# Patient Record
Sex: FEMALE | Race: WHITE
[De-identification: names, ages, dates, MRNs, and addresses within clinical notes are randomized per-mention and may not be internally consistent; named-entity substitution may affect disease eponyms.]

---

## 2017-10-18 ENCOUNTER — HOSPITAL ENCOUNTER (OUTPATIENT)
Dept: HOSPITAL 35 - MRI | Age: 67
End: 2017-10-18
Attending: ORTHOPAEDIC SURGERY
Payer: COMMERCIAL

## 2017-10-18 DIAGNOSIS — M19.012: ICD-10-CM

## 2017-10-18 DIAGNOSIS — M75.102: Primary | ICD-10-CM

## 2020-02-20 ENCOUNTER — HOSPITAL ENCOUNTER (OUTPATIENT)
Dept: HOSPITAL 35 - TBA | Age: 70
Discharge: HOME | End: 2020-02-20
Attending: OPHTHALMOLOGY
Payer: COMMERCIAL

## 2020-02-20 VITALS — SYSTOLIC BLOOD PRESSURE: 146 MMHG | DIASTOLIC BLOOD PRESSURE: 58 MMHG

## 2020-02-20 VITALS — HEIGHT: 64.02 IN | BODY MASS INDEX: 30.73 KG/M2 | WEIGHT: 180.01 LBS

## 2020-02-20 DIAGNOSIS — Z98.890: ICD-10-CM

## 2020-02-20 DIAGNOSIS — Z79.899: ICD-10-CM

## 2020-02-20 DIAGNOSIS — K21.9: ICD-10-CM

## 2020-02-20 DIAGNOSIS — I10: ICD-10-CM

## 2020-02-20 DIAGNOSIS — H04.551: Primary | ICD-10-CM

## 2020-02-20 LAB
ANION GAP SERPL CALC-SCNC: 7 MMOL/L (ref 7–16)
BUN SERPL-MCNC: 28 MG/DL (ref 7–18)
CALCIUM SERPL-MCNC: 9.3 MG/DL (ref 8.5–10.1)
CHLORIDE SERPL-SCNC: 103 MMOL/L (ref 98–107)
CO2 SERPL-SCNC: 26 MMOL/L (ref 21–32)
CREAT SERPL-MCNC: 0.9 MG/DL (ref 0.6–1)
GLUCOSE SERPL-MCNC: 100 MG/DL (ref 74–106)
POTASSIUM SERPL-SCNC: 4.2 MMOL/L (ref 3.5–5.1)
SODIUM SERPL-SCNC: 136 MMOL/L (ref 136–145)

## 2020-02-20 PROCEDURE — 56528: CPT

## 2020-02-20 PROCEDURE — 50101: CPT

## 2020-02-20 PROCEDURE — 51777: CPT

## 2020-02-20 PROCEDURE — 50398: CPT

## 2020-02-20 PROCEDURE — 64037: CPT

## 2020-02-20 PROCEDURE — 50386 REMOVE STENT VIA TRANSURETH: CPT

## 2020-02-20 PROCEDURE — 55343: CPT

## 2020-02-20 PROCEDURE — 70005: CPT

## 2020-02-20 PROCEDURE — 50010 RENAL EXPLORATION: CPT

## 2020-02-20 NOTE — O
[facility]
[place]   [address]                     OPERATIVE REPORT              
_______________________________________________________________________________
 
Name:       DI PARNELL              Room #:         150-5       [facility] 
M..#:      [id number]                       Account #:      [id number]  
Admission:  02/20/20    Attend Phys:    Reynold Cisneros MD  
Discharge:              Date of Birth:  [birth year]  
                                                          Report #: [id number]
                                                                    [id number]   
_______________________________________________________________________________
THIS REPORT FOR:  
 
cc:  Moi St MD,Echo Cisneros,Reynold DAVID MD                                          ~
CC: Echo Cisneros
 
DATE OF SERVICE:  02/20/2020
 
 
PREOPERATIVE DIAGNOSIS:  Recurrent right-sided nasolacrimal duct obstruction
with dacryocystitis.
 
POSTOPERATIVE DIAGNOSIS:  Recurrent right-sided nasolacrimal duct obstruction
with dacryocystitis.
 
PROCEDURE:  Right endoscopic dacryocystorhinostomy with silicone intubation.
 
SURGEON:  Reynold Cisneros MD.
 
ASSISTANT:  None.
 
ANESTHESIA:  General.
 
COMPLICATIONS:  None.
 
INDICATIONS FOR SURGERY:  This pleasant 69-year-old woman has a recurrent
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  She has
previously undergone surgical intervention more than once, so already
experienced a recurrent occlusion.  She presents today for an endoscopic balloon
dacryocystorhinostomy with silicone intubation.  Informed consent was obtained
to include, but not limited to the potential risk for loss of vision, bleeding,
infection, the potential need for eventual open surgery.
 
DESCRIPTION OF PROCEDURE:  The patient was taken to the operating room where
general anesthesia was administered.  The right medial canthal area and the
right lateral wall of the nose were then generously infiltrated with Xylocaine
with epinephrine mixed with Marcaine and Wydase.  The patient was subsequently
prepped and draped in the usual sterile fashion.  The right side of the nose had
been packed preoperatively with Afrin-soaked cottonoids.  The superior and
inferior puncta were then dilated with a double-ended punctum dilator.  A size 3
stainless steel Perkins probe was then passed through the superior canaliculus
down the previously created tract into the nasal vault.  The cottonoids were
removed from the nose and the nasal vault inspected.  There was a cicatrix of
 
 
 
[address]   [address]                     OPERATIVE REPORT              
_______________________________________________________________________________
 
Name:       PARMJITDI TANESHA              Room #:         150-5       [facility]..#:      [id number]                       Account #:      [id number]  
Admission:  02/20/20    Attend Phys:    Reynold Cisneros MD  
Discharge:              Date of Birth:  [birth year]  
                                                          Report #: [id number]
                                                                    [id number]   
_______________________________________________________________________________
mucus membrane tissue at the anterior root of the middle turbinate including the
ostium.  This was moved posteriorly to allow access to the ostium.  The
anesthetic solution at the beginning of the case was then injected with the aid
of the video endoscope into the area around this to minimize subsequent
bleeding.  Multiple passes were then made with a size 3 Perkins probe to
honeycomb this tissue.  They were coalesced into one large ostium.
 
The Perkins probe was removed and 5 x 8 mm balloon was lubricated and passed
through the same tract.  With direct visualization of the nose with the
endoscope, the balloon was then inflated to 9 atmospheres for 90 seconds at each
of 3 locations ensuring that the entire tract was dilated.  The balloon was then
vigorously aspirated and the tract subsequently irrigated with saline solution. 
The Parrish tubes were then passed through the superior and inferior canaliculi
and retrieved in the nose with a Parrish hook and the video endoscope.  The
tubes were then secured to themselves with 3 square throws to the lateral wall
of the nose with a 5-0 Prolene suture.  The patient then received Maxitrol drops
on the surface of the eye and erythromycin ointment on the tube.  She was then
transported to the recovery area having tolerated the procedures well with no
anesthetic or operative complications being noted.
 
 
 
 
 
 
 
 
 
 
 
 
 
 
 
 
 
 
 
 
 
 
 
 
 
                         
   By:                               
                   
D: 02/20/20 1347                           _____________________________________
T: 02/20/20 1426                           Reynold Cisneros MD            /nt

## 2020-02-20 NOTE — EKG
[facility]
[address]
[place]   [address]                     ELECTROCARDIOGRAM REPORT      
_______________________________________________________________________________
 
Name:       DI PARNELL              Room #:                     DEP [facility]..#:      [id number]                       Account #:      [id number]  
Admission:  20    Attend Phys:    Reynold Cisneros MD  
Discharge:  20    Date of Birth:  [birth year]  
                                                          Report #: [id number]
                                                                    [id number]
_______________________________________________________________________________
THIS REPORT FOR:  
 
cc:  Moi St MD,Corey Flood MD, MD                                            ~
THIS REPORT FOR:   //name//                          
 
                          [facility]
                                       
Test Date:    2020               Test Time:    10:52:54
Pat Name:     DI PARNELL           Department:   
Patient ID:   [id number]            Room:         150 5
Gender:       F                        Technician:   JOSELYN
:          1950               Requested By: Reynold Cisneros
Order Number: [id number] MD:   Corey Armenta
                                 Measurements
Intervals                              Axis          
Rate:         58                       P:            66
CO:           144                      QRS:          -34
QRSD:         102                      T:            -4
QT:           462                                    
QTc:          454                                    
                           Interpretive Statements
Sinus rhythm
Left axis deviation
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
No previous ECG available for comparison
 
Electronically Signed On 2020 16:27:56 CST by Corey Armenta
[email or web address]
 
 
 
 
 
 
 
 
 
 
 
 
 
 
  <ELECTRONICALLY SIGNED>
   By: Corey Armenta MD        
  20     1627
D: 20 1052                           _____________________________________
T: 20 1052                           Corey Armenta MD          /EPI